# Patient Record
Sex: FEMALE | Race: BLACK OR AFRICAN AMERICAN | URBAN - METROPOLITAN AREA
[De-identification: names, ages, dates, MRNs, and addresses within clinical notes are randomized per-mention and may not be internally consistent; named-entity substitution may affect disease eponyms.]

---

## 2017-02-18 ENCOUNTER — EMERGENCY (EMERGENCY)
Facility: HOSPITAL | Age: 42
LOS: 0 days | Discharge: HOME | End: 2017-02-18
Attending: EMERGENCY MEDICINE | Admitting: EMERGENCY MEDICINE
Payer: COMMERCIAL

## 2017-02-18 VITALS
HEART RATE: 88 BPM | RESPIRATION RATE: 16 BRPM | OXYGEN SATURATION: 100 % | SYSTOLIC BLOOD PRESSURE: 113 MMHG | DIASTOLIC BLOOD PRESSURE: 61 MMHG

## 2017-02-18 VITALS
HEART RATE: 89 BPM | DIASTOLIC BLOOD PRESSURE: 65 MMHG | SYSTOLIC BLOOD PRESSURE: 109 MMHG | HEIGHT: 66 IN | RESPIRATION RATE: 17 BRPM | WEIGHT: 220.02 LBS | OXYGEN SATURATION: 100 % | TEMPERATURE: 98 F

## 2017-02-18 DIAGNOSIS — R10.9 UNSPECIFIED ABDOMINAL PAIN: ICD-10-CM

## 2017-02-18 DIAGNOSIS — R11.0 NAUSEA: ICD-10-CM

## 2017-02-18 DIAGNOSIS — R35.0 FREQUENCY OF MICTURITION: ICD-10-CM

## 2017-02-18 LAB
ALBUMIN SERPL ELPH-MCNC: 3.3 G/DL — SIGNIFICANT CHANGE UP (ref 3.3–5)
ALP SERPL-CCNC: 57 U/L — SIGNIFICANT CHANGE UP (ref 40–120)
ALT FLD-CCNC: 18 U/L — SIGNIFICANT CHANGE UP (ref 12–78)
ANION GAP SERPL CALC-SCNC: 8 MMOL/L — SIGNIFICANT CHANGE UP (ref 5–17)
APPEARANCE UR: CLEAR — SIGNIFICANT CHANGE UP
AST SERPL-CCNC: 27 U/L — SIGNIFICANT CHANGE UP (ref 15–37)
BACTERIA # UR AUTO: (no result)
BASOPHILS # BLD AUTO: 0.1 K/UL — SIGNIFICANT CHANGE UP (ref 0–0.2)
BASOPHILS NFR BLD AUTO: 0.8 % — SIGNIFICANT CHANGE UP (ref 0–2)
BILIRUB SERPL-MCNC: 0.4 MG/DL — SIGNIFICANT CHANGE UP (ref 0.2–1.2)
BILIRUB UR-MCNC: NEGATIVE — SIGNIFICANT CHANGE UP
BUN SERPL-MCNC: 11 MG/DL — SIGNIFICANT CHANGE UP (ref 7–23)
CALCIUM SERPL-MCNC: 8.7 MG/DL — SIGNIFICANT CHANGE UP (ref 8.5–10.1)
CHLORIDE SERPL-SCNC: 105 MMOL/L — SIGNIFICANT CHANGE UP (ref 96–108)
CO2 SERPL-SCNC: 25 MMOL/L — SIGNIFICANT CHANGE UP (ref 22–31)
COLOR SPEC: YELLOW — SIGNIFICANT CHANGE UP
CREAT SERPL-MCNC: 0.63 MG/DL — SIGNIFICANT CHANGE UP (ref 0.5–1.3)
DIFF PNL FLD: (no result)
EOSINOPHIL # BLD AUTO: 0 K/UL — SIGNIFICANT CHANGE UP (ref 0–0.5)
EOSINOPHIL NFR BLD AUTO: 0.6 % — SIGNIFICANT CHANGE UP (ref 0–6)
EPI CELLS # UR: SIGNIFICANT CHANGE UP
GLUCOSE SERPL-MCNC: 90 MG/DL — SIGNIFICANT CHANGE UP (ref 70–99)
GLUCOSE UR QL: NEGATIVE MG/DL — SIGNIFICANT CHANGE UP
HCT VFR BLD CALC: 38.6 % — SIGNIFICANT CHANGE UP (ref 34.5–45)
HGB BLD-MCNC: 12.5 G/DL — SIGNIFICANT CHANGE UP (ref 11.5–15.5)
KETONES UR-MCNC: NEGATIVE — SIGNIFICANT CHANGE UP
LEUKOCYTE ESTERASE UR-ACNC: NEGATIVE — SIGNIFICANT CHANGE UP
LIDOCAIN IGE QN: 155 U/L — SIGNIFICANT CHANGE UP (ref 73–393)
LYMPHOCYTES # BLD AUTO: 2.7 K/UL — SIGNIFICANT CHANGE UP (ref 1–3.3)
LYMPHOCYTES # BLD AUTO: 35.9 % — SIGNIFICANT CHANGE UP (ref 13–44)
MCHC RBC-ENTMCNC: 27.2 PG — SIGNIFICANT CHANGE UP (ref 27–34)
MCHC RBC-ENTMCNC: 32.4 GM/DL — SIGNIFICANT CHANGE UP (ref 32–36)
MCV RBC AUTO: 84 FL — SIGNIFICANT CHANGE UP (ref 80–100)
MONOCYTES # BLD AUTO: 0.4 K/UL — SIGNIFICANT CHANGE UP (ref 0–0.9)
MONOCYTES NFR BLD AUTO: 4.9 % — SIGNIFICANT CHANGE UP (ref 2–14)
NEUTROPHILS # BLD AUTO: 4.3 K/UL — SIGNIFICANT CHANGE UP (ref 1.8–7.4)
NEUTROPHILS NFR BLD AUTO: 57.8 % — SIGNIFICANT CHANGE UP (ref 43–77)
NITRITE UR-MCNC: NEGATIVE — SIGNIFICANT CHANGE UP
PH UR: 5 — SIGNIFICANT CHANGE UP (ref 4.8–8)
PLATELET # BLD AUTO: 250 K/UL — SIGNIFICANT CHANGE UP (ref 150–400)
POTASSIUM SERPL-MCNC: 5.1 MMOL/L — SIGNIFICANT CHANGE UP (ref 3.5–5.3)
POTASSIUM SERPL-SCNC: 5.1 MMOL/L — SIGNIFICANT CHANGE UP (ref 3.5–5.3)
PROT SERPL-MCNC: 7.6 GM/DL — SIGNIFICANT CHANGE UP (ref 6–8.3)
PROT UR-MCNC: 15 MG/DL
RBC # BLD: 4.59 M/UL — SIGNIFICANT CHANGE UP (ref 3.8–5.2)
RBC # FLD: 13.1 % — SIGNIFICANT CHANGE UP (ref 10.3–14.5)
RBC CASTS # UR COMP ASSIST: (no result) /HPF (ref 0–4)
SODIUM SERPL-SCNC: 138 MMOL/L — SIGNIFICANT CHANGE UP (ref 135–145)
SP GR SPEC: 1.02 — SIGNIFICANT CHANGE UP (ref 1.01–1.02)
UROBILINOGEN FLD QL: NEGATIVE MG/DL — SIGNIFICANT CHANGE UP
WBC # BLD: 7.5 K/UL — SIGNIFICANT CHANGE UP (ref 3.8–10.5)
WBC # FLD AUTO: 7.5 K/UL — SIGNIFICANT CHANGE UP (ref 3.8–10.5)
WBC UR QL: SIGNIFICANT CHANGE UP

## 2017-02-18 PROCEDURE — 74176 CT ABD & PELVIS W/O CONTRAST: CPT | Mod: 26

## 2017-02-18 PROCEDURE — 99053 MED SERV 10PM-8AM 24 HR FAC: CPT

## 2017-02-18 PROCEDURE — 99284 EMERGENCY DEPT VISIT MOD MDM: CPT | Mod: 25

## 2017-02-18 RX ORDER — SODIUM CHLORIDE 9 MG/ML
1000 INJECTION INTRAMUSCULAR; INTRAVENOUS; SUBCUTANEOUS ONCE
Qty: 0 | Refills: 0 | Status: COMPLETED | OUTPATIENT
Start: 2017-02-18 | End: 2017-02-18

## 2017-02-18 RX ORDER — SODIUM CHLORIDE 9 MG/ML
3 INJECTION INTRAMUSCULAR; INTRAVENOUS; SUBCUTANEOUS ONCE
Qty: 0 | Refills: 0 | Status: COMPLETED | OUTPATIENT
Start: 2017-02-18 | End: 2017-02-18

## 2017-02-18 RX ORDER — KETOROLAC TROMETHAMINE 30 MG/ML
30 SYRINGE (ML) INJECTION ONCE
Qty: 0 | Refills: 0 | Status: DISCONTINUED | OUTPATIENT
Start: 2017-02-18 | End: 2017-02-18

## 2017-02-18 RX ORDER — ACETAMINOPHEN 500 MG
650 TABLET ORAL ONCE
Qty: 0 | Refills: 0 | Status: COMPLETED | OUTPATIENT
Start: 2017-02-18 | End: 2017-02-18

## 2017-02-18 RX ADMIN — SODIUM CHLORIDE 3 MILLILITER(S): 9 INJECTION INTRAMUSCULAR; INTRAVENOUS; SUBCUTANEOUS at 03:38

## 2017-02-18 RX ADMIN — Medication 30 MILLIGRAM(S): at 04:09

## 2017-02-18 RX ADMIN — Medication 650 MILLIGRAM(S): at 05:00

## 2017-02-18 RX ADMIN — SODIUM CHLORIDE 1000 MILLILITER(S): 9 INJECTION INTRAMUSCULAR; INTRAVENOUS; SUBCUTANEOUS at 03:38

## 2017-02-18 RX ADMIN — Medication 30 MILLIGRAM(S): at 03:39

## 2017-02-18 NOTE — ED ADULT NURSE NOTE - OBJECTIVE STATEMENT
41 year old female presenting to the ED complaining of flank pain x 1 month. Patient reports the pain has been increasing over the past month, and got worse tonight. Patient also reports frequent urination.

## 2017-02-18 NOTE — ED ADULT NURSE NOTE - CHPI ED SYMPTOMS NEG
no difficulty bearing weight/no constipation/no anorexia/no motor function loss/no neck tenderness/no tingling/no bladder dysfunction/no bowel dysfunction

## 2017-02-19 LAB
CULTURE RESULTS: NO GROWTH — SIGNIFICANT CHANGE UP
SPECIMEN SOURCE: SIGNIFICANT CHANGE UP

## 2017-07-08 ENCOUNTER — EMERGENCY (EMERGENCY)
Facility: HOSPITAL | Age: 42
LOS: 0 days | Discharge: HOME | End: 2017-07-08
Attending: EMERGENCY MEDICINE | Admitting: EMERGENCY MEDICINE
Payer: COMMERCIAL

## 2017-07-08 VITALS
TEMPERATURE: 98 F | DIASTOLIC BLOOD PRESSURE: 69 MMHG | OXYGEN SATURATION: 100 % | SYSTOLIC BLOOD PRESSURE: 114 MMHG | HEART RATE: 72 BPM | RESPIRATION RATE: 18 BRPM

## 2017-07-08 VITALS — HEIGHT: 66 IN | WEIGHT: 220.9 LBS

## 2017-07-08 DIAGNOSIS — S83.91XA SPRAIN OF UNSPECIFIED SITE OF RIGHT KNEE, INITIAL ENCOUNTER: ICD-10-CM

## 2017-07-08 DIAGNOSIS — S93.491A SPRAIN OF OTHER LIGAMENT OF RIGHT ANKLE, INITIAL ENCOUNTER: ICD-10-CM
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
## 2017-07-08 PROCEDURE — 99284 EMERGENCY DEPT VISIT MOD MDM: CPT

## 2017-07-08 PROCEDURE — 73630 X-RAY EXAM OF FOOT: CPT | Mod: 26,LT

## 2017-07-08 PROCEDURE — 73600 X-RAY EXAM OF ANKLE: CPT | Mod: 26

## 2017-07-08 PROCEDURE — 73564 X-RAY EXAM KNEE 4 OR MORE: CPT | Mod: 26,RT

## 2017-07-08 PROCEDURE — 29505 APPLICATION LONG LEG SPLINT: CPT | Mod: RT

## 2017-07-08 RX ORDER — OXYCODONE HYDROCHLORIDE 5 MG/1
5 TABLET ORAL ONCE
Qty: 0 | Refills: 0 | Status: DISCONTINUED | OUTPATIENT
Start: 2017-07-08 | End: 2017-07-08

## 2017-07-08 RX ADMIN — OXYCODONE HYDROCHLORIDE 5 MILLIGRAM(S): 5 TABLET ORAL at 12:54

## 2017-07-08 RX ADMIN — OXYCODONE HYDROCHLORIDE 5 MILLIGRAM(S): 5 TABLET ORAL at 12:15

## 2017-07-08 NOTE — ED STATDOCS - PROGRESS NOTE DETAILS
DILEEP [place]: Patient has been seen, evaluated and orders have been written by the attending in intake. Patient is stable.  I will follow up the results of orders written and I will continue to evaluate/observe the patient. pt aware of xray findings. d/w dr gerard. will give crutches to assist in ambulation

## 2017-07-08 NOTE — ED STATDOCS - SECONDARY DIAGNOSIS.
Sprain of right ankle, unspecified ligament, initial encounter Foot sprain, right, initial encounter

## 2017-07-08 NOTE — ED STATDOCS - CARE PLAN
Principal Discharge DX:	Sprain of right knee, unspecified ligament, initial encounter  Secondary Diagnosis:	Sprain of right ankle, unspecified ligament, initial encounter  Secondary Diagnosis:	Foot sprain, right, initial encounter

## 2017-07-08 NOTE — ED STATDOCS - OBJECTIVE STATEMENT
40 y/o F w/ no PHMx presents to ED c/o R knee pain s/p mechanical fall at work on wet floor. Pt states her right foot went under her when she fell, pain in her, R anterior knee and R ankle, pt unable to ambulate. Pt currently calm, not presenting distress, and denies hitting her head, LOC, or any other acute c/o. Pt states she took 3 Motrin.

## 2019-05-23 ENCOUNTER — EMERGENCY (EMERGENCY)
Facility: HOSPITAL | Age: 44
LOS: 0 days | Discharge: HOME | End: 2019-05-23
Attending: EMERGENCY MEDICINE | Admitting: EMERGENCY MEDICINE
Payer: COMMERCIAL

## 2019-05-23 VITALS
HEART RATE: 81 BPM | DIASTOLIC BLOOD PRESSURE: 75 MMHG | TEMPERATURE: 98 F | OXYGEN SATURATION: 100 % | RESPIRATION RATE: 18 BRPM | SYSTOLIC BLOOD PRESSURE: 117 MMHG

## 2019-05-23 VITALS — HEIGHT: 66 IN | WEIGHT: 238.1 LBS

## 2019-05-23 DIAGNOSIS — K59.00 CONSTIPATION, UNSPECIFIED: ICD-10-CM

## 2019-05-23 DIAGNOSIS — G43.909 MIGRAINE, UNSPECIFIED, NOT INTRACTABLE, WITHOUT STATUS MIGRAINOSUS: ICD-10-CM

## 2019-05-23 DIAGNOSIS — D25.9 LEIOMYOMA OF UTERUS, UNSPECIFIED: ICD-10-CM

## 2019-05-23 DIAGNOSIS — K52.9 NONINFECTIVE GASTROENTERITIS AND COLITIS, UNSPECIFIED: ICD-10-CM

## 2019-05-23 DIAGNOSIS — N84.0 POLYP OF CORPUS UTERI: ICD-10-CM

## 2019-05-23 DIAGNOSIS — R10.9 UNSPECIFIED ABDOMINAL PAIN: ICD-10-CM

## 2019-05-23 LAB
ALBUMIN SERPL ELPH-MCNC: 3.3 G/DL — SIGNIFICANT CHANGE UP (ref 3.3–5)
ALP SERPL-CCNC: 71 U/L — SIGNIFICANT CHANGE UP (ref 40–120)
ALT FLD-CCNC: 22 U/L — SIGNIFICANT CHANGE UP (ref 12–78)
ANION GAP SERPL CALC-SCNC: 6 MMOL/L — SIGNIFICANT CHANGE UP (ref 5–17)
APPEARANCE UR: CLEAR — SIGNIFICANT CHANGE UP
AST SERPL-CCNC: 16 U/L — SIGNIFICANT CHANGE UP (ref 15–37)
BACTERIA # UR AUTO: ABNORMAL
BASOPHILS # BLD AUTO: 0 K/UL — SIGNIFICANT CHANGE UP (ref 0–0.2)
BASOPHILS NFR BLD AUTO: 0 % — SIGNIFICANT CHANGE UP (ref 0–2)
BILIRUB SERPL-MCNC: 0.3 MG/DL — SIGNIFICANT CHANGE UP (ref 0.2–1.2)
BILIRUB UR-MCNC: NEGATIVE — SIGNIFICANT CHANGE UP
BUN SERPL-MCNC: 9 MG/DL — SIGNIFICANT CHANGE UP (ref 7–23)
CALCIUM SERPL-MCNC: 9.1 MG/DL — SIGNIFICANT CHANGE UP (ref 8.5–10.1)
CHLORIDE SERPL-SCNC: 104 MMOL/L — SIGNIFICANT CHANGE UP (ref 96–108)
CO2 SERPL-SCNC: 28 MMOL/L — SIGNIFICANT CHANGE UP (ref 22–31)
COLOR SPEC: YELLOW — SIGNIFICANT CHANGE UP
CREAT SERPL-MCNC: 0.6 MG/DL — SIGNIFICANT CHANGE UP (ref 0.5–1.3)
DIFF PNL FLD: ABNORMAL
EOSINOPHIL # BLD AUTO: 0.3 K/UL — SIGNIFICANT CHANGE UP (ref 0–0.5)
EOSINOPHIL NFR BLD AUTO: 4 % — SIGNIFICANT CHANGE UP (ref 0–6)
EPI CELLS # UR: ABNORMAL
GLUCOSE SERPL-MCNC: 89 MG/DL — SIGNIFICANT CHANGE UP (ref 70–99)
GLUCOSE UR QL: NEGATIVE MG/DL — SIGNIFICANT CHANGE UP
HCT VFR BLD CALC: 37.4 % — SIGNIFICANT CHANGE UP (ref 34.5–45)
HGB BLD-MCNC: 11.7 G/DL — SIGNIFICANT CHANGE UP (ref 11.5–15.5)
KETONES UR-MCNC: NEGATIVE — SIGNIFICANT CHANGE UP
LEUKOCYTE ESTERASE UR-ACNC: ABNORMAL
LIDOCAIN IGE QN: 101 U/L — SIGNIFICANT CHANGE UP (ref 73–393)
LYMPHOCYTES # BLD AUTO: 2.48 K/UL — SIGNIFICANT CHANGE UP (ref 1–3.3)
LYMPHOCYTES # BLD AUTO: 33 % — SIGNIFICANT CHANGE UP (ref 13–44)
MANUAL SMEAR VERIFICATION: SIGNIFICANT CHANGE UP
MCHC RBC-ENTMCNC: 26.6 PG — LOW (ref 27–34)
MCHC RBC-ENTMCNC: 31.3 GM/DL — LOW (ref 32–36)
MCV RBC AUTO: 85 FL — SIGNIFICANT CHANGE UP (ref 80–100)
MONOCYTES # BLD AUTO: 0.15 K/UL — SIGNIFICANT CHANGE UP (ref 0–0.9)
MONOCYTES NFR BLD AUTO: 2 % — SIGNIFICANT CHANGE UP (ref 2–14)
NEUTROPHILS # BLD AUTO: 4.44 K/UL — SIGNIFICANT CHANGE UP (ref 1.8–7.4)
NEUTROPHILS NFR BLD AUTO: 59 % — SIGNIFICANT CHANGE UP (ref 43–77)
NITRITE UR-MCNC: NEGATIVE — SIGNIFICANT CHANGE UP
NRBC # BLD: 0 /100 — SIGNIFICANT CHANGE UP (ref 0–0)
NRBC # BLD: SIGNIFICANT CHANGE UP /100 WBCS (ref 0–0)
PH UR: 5 — SIGNIFICANT CHANGE UP (ref 5–8)
PLAT MORPH BLD: NORMAL — SIGNIFICANT CHANGE UP
PLATELET # BLD AUTO: 309 K/UL — SIGNIFICANT CHANGE UP (ref 150–400)
POTASSIUM SERPL-MCNC: 3.2 MMOL/L — LOW (ref 3.5–5.3)
POTASSIUM SERPL-SCNC: 3.2 MMOL/L — LOW (ref 3.5–5.3)
PROT SERPL-MCNC: 7.4 GM/DL — SIGNIFICANT CHANGE UP (ref 6–8.3)
PROT UR-MCNC: 15 MG/DL
RBC # BLD: 4.4 M/UL — SIGNIFICANT CHANGE UP (ref 3.8–5.2)
RBC # FLD: 14.7 % — HIGH (ref 10.3–14.5)
RBC BLD AUTO: SIGNIFICANT CHANGE UP
RBC CASTS # UR COMP ASSIST: SIGNIFICANT CHANGE UP /HPF (ref 0–4)
SODIUM SERPL-SCNC: 138 MMOL/L — SIGNIFICANT CHANGE UP (ref 135–145)
SP GR SPEC: 1.02 — SIGNIFICANT CHANGE UP (ref 1.01–1.02)
UROBILINOGEN FLD QL: NEGATIVE MG/DL — SIGNIFICANT CHANGE UP
VARIANT LYMPHS # BLD: 2 % — SIGNIFICANT CHANGE UP (ref 0–6)
WBC # BLD: 7.53 K/UL — SIGNIFICANT CHANGE UP (ref 3.8–10.5)
WBC # FLD AUTO: 7.53 K/UL — SIGNIFICANT CHANGE UP (ref 3.8–10.5)
WBC UR QL: ABNORMAL

## 2019-05-23 PROCEDURE — 74177 CT ABD & PELVIS W/CONTRAST: CPT | Mod: 26

## 2019-05-23 PROCEDURE — 76856 US EXAM PELVIC COMPLETE: CPT | Mod: 26

## 2019-05-23 PROCEDURE — 99284 EMERGENCY DEPT VISIT MOD MDM: CPT

## 2019-05-23 RX ORDER — POTASSIUM CHLORIDE 20 MEQ
40 PACKET (EA) ORAL ONCE
Refills: 0 | Status: COMPLETED | OUTPATIENT
Start: 2019-05-23 | End: 2019-05-23

## 2019-05-23 RX ORDER — FAMOTIDINE 10 MG/ML
20 INJECTION INTRAVENOUS ONCE
Refills: 0 | Status: COMPLETED | OUTPATIENT
Start: 2019-05-23 | End: 2019-05-23

## 2019-05-23 RX ORDER — ACETAMINOPHEN 500 MG
650 TABLET ORAL ONCE
Refills: 0 | Status: COMPLETED | OUTPATIENT
Start: 2019-05-23 | End: 2019-05-23

## 2019-05-23 RX ADMIN — Medication 650 MILLIGRAM(S): at 18:00

## 2019-05-23 RX ADMIN — Medication 30 MILLILITER(S): at 17:45

## 2019-05-23 RX ADMIN — Medication 40 MILLIEQUIVALENT(S): at 18:07

## 2019-05-23 RX ADMIN — FAMOTIDINE 20 MILLIGRAM(S): 10 INJECTION INTRAVENOUS at 17:45

## 2019-05-23 RX ADMIN — Medication 650 MILLIGRAM(S): at 17:45

## 2019-05-23 RX ADMIN — Medication 1 TABLET(S): at 19:24

## 2019-05-23 NOTE — ED STATDOCS - CARE PLAN
Principal Discharge DX:	Colitis  Secondary Diagnosis:	Fibroid (bleeding) (uterine)  Secondary Diagnosis:	Polyp of female genital tract

## 2019-05-23 NOTE — ED ADULT NURSE NOTE - NSFALLRSKINDICATORS_ED_ALL_ED
I called her number at [phone] but did not want to leave a message regarding hepatitis C status.  This is being sent for confirmation so we'll try back early next week.   no

## 2019-05-23 NOTE — ED STATDOCS - PROGRESS NOTE DETAILS
44 y/o female with a PMHx of 2 c-sections presents to the ED c/o diffuse abd pain. +migraines. +constipation. Pain started 4 days ago in LLQ and has moved to epigastric area and LUQ. Exacerbated after eating. Pt is eating normally though. Pt was seen at an  and was given Zofran for nausea x3 days ago but has finished medication. Had endoscopy a few months ago for similar sx's that happen every year secondary to gastritis. Denies fever, CP, SOB, dysuria. Currently on her menstrual cycle x12 days which is abnormal for pt. No OB/GYN. NKDA. Nonsmoker.  Christie Rosen PA-C WIll obtain US to rule out fibroid, labs, CT. Christie Rosen PA-C I discussed in detail labs/CT/US results with patient.  Pt. added on she has had rectal bleeding "for a long time".  I stressed importance of following up with GI for colonoscopy and GYN for further evaluation of the "polyp" and fibroid on US.  Pt. understands this may be pre-cancerous or cancerous.  Copies of labs/US/CT provided to patient.  Augmentin 875 mg PO to treat the colitis.  Christie Rosen PA-C

## 2019-05-23 NOTE — ED ADULT NURSE NOTE - NSIMPLEMENTINTERV_GEN_ALL_ED
Implemented All Universal Safety Interventions:  [place] to call system. Call bell, personal items and telephone within reach. Instruct patient to call for assistance. Room bathroom lighting operational. Non-slip footwear when patient is off stretcher. Physically safe environment: no spills, clutter or unnecessary equipment. Stretcher in lowest position, wheels locked, appropriate side rails in place.

## 2019-05-23 NOTE — ED STATDOCS - PHYSICAL EXAMINATION
Constitutional: no apparent distress AAOx3, Eyes: PERRLA EOMI, Head: Normocephalic atraumatic, Mouth: MMM, Cardiac: regular rate and rhythm, Resp: Lungs CTAB, GI: +TTP LUQ, epigastric region, Neuro: CN2-12 intact, Skin: No rashes Constitutional: no apparent distress AAOx3, Eyes: PERRLA EOMI, Head: Normocephalic atraumatic, Mouth: MMM, Cardiac: regular rate and rhythm, Resp: Lungs CTAB, GI: +TTP LUQ/llq, epigastric region, Neuro: CN2-12 intact, Skin: No rashes

## 2019-05-23 NOTE — ED STATDOCS - OBJECTIVE STATEMENT
42 y/o female with a PMHx of 2 c-sections presents to the ED c/o diffuse abd pain. +migraines. +constipation. Pain started 4 days ago in LLQ and has moved to epigastric area and LUQ. Exacerbated after eating. Pt is eating normally though. Pt was seen at an  and was given Zofran for nausea x3 days ago but has finished medication. Had endoscopy a few months ago for similar sx's that happen every year secondary to gastritis. Denies fever, CP, SOB, dysuria. Currently on her menstrual cycle x12 days which is abnormal for pt. No OB/GYN. NKDA. Nonsmoker.

## 2019-05-23 NOTE — ED STATDOCS - NS_ ATTENDINGSCRIBEDETAILS _ED_A_ED_FT
I, Andrey Charles MD,  performed the initial face to face bedside interview with this patient regarding history of present illness, review of symptoms and relevant past medical, social and family history.  I completed an independent physical examination.  I was the initial provider who evaluated this patient.  The history, relevant review of systems, past medical and surgical history, medical decision making, and physical examination was documented by the scribe in my presence and I attest to the accuracy of the documentation.

## 2019-05-23 NOTE — ED STATDOCS - NS ED ROS FT
Constitutional: No fever or chills  Eyes: No visual changes  HEENT: No throat pain  CV: No chest pain  Resp: No SOB no cough  GI: +diffuse abd pain, constipation   : No dysuria  MSK: No musculoskeletal pain  Skin: No rash  Neuro: +migraines

## 2019-05-23 NOTE — ED STATDOCS - CLINICAL SUMMARY MEDICAL DECISION MAKING FREE TEXT BOX
44 y/o female presents to ED with abd pain, N/V, on left side for few days. Also with constipation and abnormal bleeding. Exam TTP LUQ and LLQ. No rebound, no guarding. Concern for uterine pathology vs. diverticulitis. Will obtain labs, US, CT, and reassess. 44 y/o female presents to ED with abd pain, N/V, on left side for few days. Also with constipation and abnormal bleeding. Exam TTP LUQ and LLQ. No rebound, no guarding. Concern for uterine pathology vs. diverticulitis. Will obtain labs, US, CT, and reassess.    I discussed in detail labs/CT/US results with patient.  Pt. added on she has had rectal bleeding "for a long time".  I stressed importance of following up with GI for colonoscopy and GYN for further evaluation of the "polyp" and fibroid on US.  Pt. understands this may be pre-cancerous or cancerous.  Copies of labs/US/CT provided to patient.  Augmentin 875 mg PO to treat the colitis.  Christie Rosen PA-C

## 2019-06-04 ENCOUNTER — RESULT REVIEW (OUTPATIENT)
Age: 44
End: 2019-06-04

## 2021-06-27 ENCOUNTER — EMERGENCY (EMERGENCY)
Facility: HOSPITAL | Age: 46
LOS: 0 days | Discharge: HOME | End: 2021-06-27
Attending: EMERGENCY MEDICINE
Payer: COMMERCIAL

## 2021-06-27 VITALS
TEMPERATURE: 99 F | SYSTOLIC BLOOD PRESSURE: 121 MMHG | OXYGEN SATURATION: 100 % | DIASTOLIC BLOOD PRESSURE: 77 MMHG | RESPIRATION RATE: 17 BRPM | HEART RATE: 81 BPM

## 2021-06-27 VITALS — WEIGHT: 237 LBS | HEIGHT: 66 IN

## 2021-06-27 DIAGNOSIS — Z98.890 OTHER SPECIFIED POSTPROCEDURAL STATES: Chronic | ICD-10-CM

## 2021-06-27 DIAGNOSIS — N39.0 URINARY TRACT INFECTION, SITE NOT SPECIFIED: ICD-10-CM

## 2021-06-27 DIAGNOSIS — Z98.891 HISTORY OF UTERINE SCAR FROM PREVIOUS SURGERY: Chronic | ICD-10-CM

## 2021-06-27 DIAGNOSIS — K21.9 GASTRO-ESOPHAGEAL REFLUX DISEASE WITHOUT ESOPHAGITIS: ICD-10-CM

## 2021-06-27 DIAGNOSIS — Z87.39 PERSONAL HISTORY OF OTHER DISEASES OF THE MUSCULOSKELETAL SYSTEM AND CONNECTIVE TISSUE: ICD-10-CM

## 2021-06-27 DIAGNOSIS — R10.13 EPIGASTRIC PAIN: ICD-10-CM

## 2021-06-27 LAB
ALBUMIN SERPL ELPH-MCNC: 3.2 G/DL — LOW (ref 3.3–5)
ALP SERPL-CCNC: 67 U/L — SIGNIFICANT CHANGE UP (ref 40–120)
ALT FLD-CCNC: 21 U/L — SIGNIFICANT CHANGE UP (ref 12–78)
ANION GAP SERPL CALC-SCNC: 6 MMOL/L — SIGNIFICANT CHANGE UP (ref 5–17)
APPEARANCE UR: CLEAR — SIGNIFICANT CHANGE UP
AST SERPL-CCNC: 21 U/L — SIGNIFICANT CHANGE UP (ref 15–37)
BASOPHILS # BLD AUTO: 0.03 K/UL — SIGNIFICANT CHANGE UP (ref 0–0.2)
BASOPHILS NFR BLD AUTO: 0.4 % — SIGNIFICANT CHANGE UP (ref 0–2)
BILIRUB SERPL-MCNC: 0.2 MG/DL — SIGNIFICANT CHANGE UP (ref 0.2–1.2)
BILIRUB UR-MCNC: NEGATIVE — SIGNIFICANT CHANGE UP
BUN SERPL-MCNC: 9 MG/DL — SIGNIFICANT CHANGE UP (ref 7–23)
CALCIUM SERPL-MCNC: 9.1 MG/DL — SIGNIFICANT CHANGE UP (ref 8.5–10.1)
CHLORIDE SERPL-SCNC: 105 MMOL/L — SIGNIFICANT CHANGE UP (ref 96–108)
CO2 SERPL-SCNC: 24 MMOL/L — SIGNIFICANT CHANGE UP (ref 22–31)
COLOR SPEC: YELLOW — SIGNIFICANT CHANGE UP
CREAT SERPL-MCNC: 0.62 MG/DL — SIGNIFICANT CHANGE UP (ref 0.5–1.3)
DIFF PNL FLD: NEGATIVE — SIGNIFICANT CHANGE UP
EOSINOPHIL # BLD AUTO: 0.05 K/UL — SIGNIFICANT CHANGE UP (ref 0–0.5)
EOSINOPHIL NFR BLD AUTO: 0.6 % — SIGNIFICANT CHANGE UP (ref 0–6)
GLUCOSE SERPL-MCNC: 94 MG/DL — SIGNIFICANT CHANGE UP (ref 70–99)
GLUCOSE UR QL: NEGATIVE MG/DL — SIGNIFICANT CHANGE UP
HCT VFR BLD CALC: 35 % — SIGNIFICANT CHANGE UP (ref 34.5–45)
HGB BLD-MCNC: 11.1 G/DL — LOW (ref 11.5–15.5)
IMM GRANULOCYTES NFR BLD AUTO: 0.2 % — SIGNIFICANT CHANGE UP (ref 0–1.5)
KETONES UR-MCNC: ABNORMAL
LEUKOCYTE ESTERASE UR-ACNC: ABNORMAL
LIDOCAIN IGE QN: 103 U/L — SIGNIFICANT CHANGE UP (ref 73–393)
LYMPHOCYTES # BLD AUTO: 2.36 K/UL — SIGNIFICANT CHANGE UP (ref 1–3.3)
LYMPHOCYTES # BLD AUTO: 29 % — SIGNIFICANT CHANGE UP (ref 13–44)
MCHC RBC-ENTMCNC: 26.2 PG — LOW (ref 27–34)
MCHC RBC-ENTMCNC: 31.7 GM/DL — LOW (ref 32–36)
MCV RBC AUTO: 82.7 FL — SIGNIFICANT CHANGE UP (ref 80–100)
MONOCYTES # BLD AUTO: 0.56 K/UL — SIGNIFICANT CHANGE UP (ref 0–0.9)
MONOCYTES NFR BLD AUTO: 6.9 % — SIGNIFICANT CHANGE UP (ref 2–14)
NEUTROPHILS # BLD AUTO: 5.12 K/UL — SIGNIFICANT CHANGE UP (ref 1.8–7.4)
NEUTROPHILS NFR BLD AUTO: 62.9 % — SIGNIFICANT CHANGE UP (ref 43–77)
NITRITE UR-MCNC: NEGATIVE — SIGNIFICANT CHANGE UP
PH UR: 5 — SIGNIFICANT CHANGE UP (ref 5–8)
PLATELET # BLD AUTO: 324 K/UL — SIGNIFICANT CHANGE UP (ref 150–400)
POTASSIUM SERPL-MCNC: 3.5 MMOL/L — SIGNIFICANT CHANGE UP (ref 3.5–5.3)
POTASSIUM SERPL-SCNC: 3.5 MMOL/L — SIGNIFICANT CHANGE UP (ref 3.5–5.3)
PROT SERPL-MCNC: 7.5 GM/DL — SIGNIFICANT CHANGE UP (ref 6–8.3)
PROT UR-MCNC: 30 MG/DL
RBC # BLD: 4.23 M/UL — SIGNIFICANT CHANGE UP (ref 3.8–5.2)
RBC # FLD: 15 % — HIGH (ref 10.3–14.5)
SODIUM SERPL-SCNC: 135 MMOL/L — SIGNIFICANT CHANGE UP (ref 135–145)
SP GR SPEC: 1.02 — SIGNIFICANT CHANGE UP (ref 1.01–1.02)
UROBILINOGEN FLD QL: NEGATIVE MG/DL — SIGNIFICANT CHANGE UP
WBC # BLD: 8.14 K/UL — SIGNIFICANT CHANGE UP (ref 3.8–10.5)
WBC # FLD AUTO: 8.14 K/UL — SIGNIFICANT CHANGE UP (ref 3.8–10.5)

## 2021-06-27 PROCEDURE — 36415 COLL VENOUS BLD VENIPUNCTURE: CPT

## 2021-06-27 PROCEDURE — 87086 URINE CULTURE/COLONY COUNT: CPT

## 2021-06-27 PROCEDURE — 99285 EMERGENCY DEPT VISIT HI MDM: CPT

## 2021-06-27 PROCEDURE — 85025 COMPLETE CBC W/AUTO DIFF WBC: CPT

## 2021-06-27 PROCEDURE — 76705 ECHO EXAM OF ABDOMEN: CPT | Mod: 26

## 2021-06-27 PROCEDURE — 93010 ELECTROCARDIOGRAM REPORT: CPT

## 2021-06-27 PROCEDURE — 83690 ASSAY OF LIPASE: CPT

## 2021-06-27 PROCEDURE — 80053 COMPREHEN METABOLIC PANEL: CPT

## 2021-06-27 PROCEDURE — 99284 EMERGENCY DEPT VISIT MOD MDM: CPT | Mod: 25

## 2021-06-27 PROCEDURE — 81001 URINALYSIS AUTO W/SCOPE: CPT

## 2021-06-27 PROCEDURE — 93005 ELECTROCARDIOGRAM TRACING: CPT

## 2021-06-27 PROCEDURE — 76705 ECHO EXAM OF ABDOMEN: CPT

## 2021-06-27 PROCEDURE — C9113: CPT

## 2021-06-27 RX ORDER — PANTOPRAZOLE SODIUM 20 MG/1
40 TABLET, DELAYED RELEASE ORAL ONCE
Refills: 0 | Status: COMPLETED | OUTPATIENT
Start: 2021-06-27 | End: 2021-06-27

## 2021-06-27 RX ORDER — OMEPRAZOLE 10 MG/1
1 CAPSULE, DELAYED RELEASE ORAL
Qty: 10 | Refills: 0
Start: 2021-06-27 | End: 2021-07-06

## 2021-06-27 RX ORDER — CEPHALEXIN 500 MG
500 CAPSULE ORAL ONCE
Refills: 0 | Status: COMPLETED | OUTPATIENT
Start: 2021-06-27 | End: 2021-06-27

## 2021-06-27 RX ORDER — CEPHALEXIN 500 MG
1 CAPSULE ORAL
Qty: 14 | Refills: 0
Start: 2021-06-27 | End: 2021-07-03

## 2021-06-27 RX ADMIN — Medication 30 MILLILITER(S): at 21:36

## 2021-06-27 RX ADMIN — Medication 500 MILLIGRAM(S): at 22:58

## 2021-06-27 RX ADMIN — PANTOPRAZOLE SODIUM 40 MILLIGRAM(S): 20 TABLET, DELAYED RELEASE ORAL at 21:36

## 2021-06-27 NOTE — ED STATDOCS - CARE PROVIDER_API CALL
Andrey Marshall  GASTROENTEROLOGY  [address] [place]  [place] [address]  Phone: [phone]  Fax: [phone]  Follow Up Time:     Andrey Alejandro)  Gastroenterology; Internal Medicine  [address] [place] [address] [place] [address]  Phone: [phone]  Fax: [phone]  Follow Up Time:

## 2021-06-27 NOTE — ED STATDOCS - NSFOLLOWUPINSTRUCTIONS_ED_ALL_ED_FT
Urinary Tract Infection, Adult  ImageA urinary tract infection (UTI) is an infection of any part of the urinary tract, which includes the kidneys, ureters, bladder, and urethra. These organs make, store, and get rid of urine in the body. UTI can be a bladder infection (cystitis) or kidney infection (pyelonephritis).    What are the causes?  This infection may be caused by fungi, viruses, or bacteria. Bacteria are the most common cause of UTIs. This condition can also be caused by repeated incomplete emptying of the bladder during urination.    What increases the risk?  This condition is more likely to develop if:    You ignore your need to urinate or hold urine for long periods of time.  You do not empty your bladder completely during urination.  You wipe back to front after urinating or having a bowel movement, if you are female.  You are uncircumcised, if you are male.  You are constipated.  You have a urinary catheter that stays in place (indwelling).  You have a weak defense (immune) system.  You have a medical condition that affects your bowels, kidneys, or bladder.  You have diabetes.  You take antibiotic medicines frequently or for long periods of time, and the antibiotics no longer work well against certain types of infections (antibiotic resistance).  You take medicines that irritate your urinary tract.  You are exposed to chemicals that irritate your urinary tract.  You are female.    What are the signs or symptoms?  Symptoms of this condition include:    Fever.  Frequent urination or passing small amounts of urine frequently.  Needing to urinate urgently.  Pain or burning with urination.  Urine that smells bad or unusual.  Cloudy urine.  Pain in the lower abdomen or back.  Trouble urinating.  Blood in the urine.  Vomiting or being less hungry than normal.  Diarrhea or abdominal pain.  Vaginal discharge, if you are female.    How is this diagnosed?  This condition is diagnosed with a medical history and physical exam. You will also need to provide a urine sample to test your urine. Other tests may be done, including:    Blood tests.  Sexually transmitted disease (STD) testing.    If you have had more than one UTI, a cystoscopy or imaging studies may be done to determine the cause of the infections.    How is this treated?  Treatment for this condition often includes a combination of two or more of the following:    Antibiotic medicine.  Other medicines to treat less common causes of UTI.  Over-the-counter medicines to treat pain.  Drinking enough water to stay hydrated.    Follow these instructions at home:  Take over-the-counter and prescription medicines only as told by your health care provider.  If you were prescribed an antibiotic, take it as told by your health care provider. Do not stop taking the antibiotic even if you start to feel better.  Avoid alcohol, caffeine, tea, and carbonated beverages. They can irritate your bladder.  Drink enough fluid to keep your urine clear or pale yellow.  Keep all follow-up visits as told by your health care provider. This is important.  ImageMake sure to:    Empty your bladder often and completely. Do not hold urine for long periods of time.  Empty your bladder before and after sex.  Wipe from front to back after a bowel movement if you are female. Use each tissue one time when you wipe.    Contact a health care provider if:  You have back pain.  You have a fever.  You feel nauseous or vomit.  Your symptoms do not get better after 3 days.  Your symptoms go away and then return.  Get help right away if:  You have severe back pain or lower abdominal pain.  You are vomiting and cannot keep down any medicines or water.  This information is not intended to replace advice given to you by your health care provider. Make sure you discuss any questions you have with your health care provider.      Epigastric Pain    WHAT YOU NEED TO KNOW:    Epigastric pain is felt in the middle of the upper abdomen, between the ribs and the bellybutton. The pain may be mild or severe. Pain may spread from or to another part of your body. Epigastric pain may be a sign of a serious health problem that needs to be treated.     DISCHARGE INSTRUCTIONS:    Call 911 for any of the following:   •You have any of the following signs of a heart attack: ?Squeezing, pressure, or pain in your chest      ?You may also have any of the following: ?Discomfort or pain in your back, neck, jaw, stomach, or arm      ?Shortness of breath      ?Nausea or vomiting      ?Lightheadedness or a sudden cold sweat        •You have severe pain that radiates to your jaw or back.      Return to the emergency department if:   •You have severe pain that starts suddenly and quickly gets worse.      •You cannot have a bowel movement and are vomiting.      •You vomit or cough up blood.      •You see blood in your urine or bowel movement.      •You feel drowsy and your breathing is slower than usual.      Contact your healthcare provider if:   •You have a fever or chills.      •You have yellowing of your skin or the whites of your eyes.      •You vomit often or several times in a row.       •You lose weight without trying.      •You have symptoms for longer than 2 weeks.      •You have questions or concerns about your condition or care.      Medicines:   •Medicines may be given to treat pain or stop vomiting. You may also need medicines to reduce or control stomach acid, or treat an infection.      •Take your medicine as directed. Contact your healthcare provider if you think your medicine is not helping or if you have side effects. Tell him of her if you are allergic to any medicine. Keep a list of the medicines, vitamins, and herbs you take. Include the amounts, and when and why you take them. Bring the list or the pill bottles to follow-up visits. Carry your medicine list with you in case of an emergency.      Follow up with your healthcare provider as directed: Write down your questions so you remember to ask them during your visits.     Manage your symptoms:   •Keep a record of your symptoms. Include when the pain starts, how long it lasts, and if it is sharp or dull. Also include any foods you ate or activities you did before the pain started. Keep track of anything that helped the pain.       •Eat a variety of healthy foods. Healthy foods include fruits, vegetables, whole-grain breads, low-fat dairy products, beans, lean meats, and fish. Ask if you need to be on a special diet. Certain foods may cause your pain, such as alcohol or foods that are high in fat. You may need to eat smaller meals and to eat more often than usual.      •Drink liquids as directed. Ask how much liquid to drink each day and which liquids are best for you. Do not have drinks that contain alcohol or caffeine.    Continue Omeprazole 20 mg. PO daily.  Take Keflex as directed.  Drink plenty of fluids.  Follow up with Gastroenterologist.

## 2021-06-27 NOTE — ED STATDOCS - ATTENDING CONTRIBUTION TO CARE
Attending Contribution to Care: I, Bethany Saldana, performed the initial face to face bedside interview with this patient regarding history of present illness, review of symptoms and relevant past medical, social and family history.  I completed an independent physical examination.  I was the initial provider who evaluated this patient. I have signed out the follow up of any pending tests (i.e. labs, radiological studies) to the ACP.  I have communicated the patient’s plan of care and disposition with the ACP.

## 2021-06-27 NOTE — ED STATDOCS - OBJECTIVE STATEMENT
46 y/o F with PMHx of GERD (non compliant with PPI), gastritis, s/p endoscopy x2 in the past, s/p , s/p breast surgery, and s/p foot surgery presents ambulatory to the ED c/o intermittent +epigastric abd pain x3 weeks with associated +lightheadedness. No aggravating factors. Reports she is unable to wear a wire bra 2/2 pain. No N/V/D, hematochezia, or melena. Able to eat and drink. Notes having UTI 1 week ago treated with abx and pyridium. Has not taken anything for abd pain. Never smoker. No drug or ETOH use. NKDA.

## 2021-06-27 NOTE — ED ADULT NURSE NOTE - OBJECTIVE STATEMENT
Pt presents to ED for abdominal pain that started 3 weeks ago with worsening pain and lightheadedness today. Pt denies chest pain, shortness of breath, LOC, nausea/vomiting/diarrhea, fever/chills. Pt endorses COVID vaccinated. Pt states that she has been able to eat without any issues. Pt states she was treated for a UTI 1 week ago with antibiotics.

## 2021-06-27 NOTE — ED STATDOCS - PATIENT PORTAL LINK FT
You can access the FollowMyHealth Patient Portal offered by [facility] by registering at the following website: [email or web address]. By joining [employer]’s FollowMyHealth portal, you will also be able to view your health information using other applications (apps) compatible with our system.

## 2021-06-27 NOTE — ED STATDOCS - CLINICAL SUMMARY MEDICAL DECISION MAKING FREE TEXT BOX
44 y/o F with PMHx of gastritis non compliant with PPI here with epigastric abd pain, follows with GI locally. Epigastric TTP on exam. Labs, EKG, upper abd US to be done in ED. IV Pantoprazole and Maalox to be given.

## 2021-06-27 NOTE — ED ADULT TRIAGE NOTE - CHIEF COMPLAINT QUOTE
Pt ambulatory to ED c/o epigastric pain x3 weeks. Pt states sometimes it's so painful she's unable to keep a bra on. Denies chest pain but endorses intermitted SOB. No NVD, no belching. Also c/o intermittent migraines. Did not take anything for pain.

## 2021-06-27 NOTE — ED STATDOCS - CARE PROVIDERS DIRECT ADDRESSES
,DirectAddress_Unknown,[email or web address]

## 2021-06-29 LAB
CULTURE RESULTS: SIGNIFICANT CHANGE UP
SPECIMEN SOURCE: SIGNIFICANT CHANGE UP

## 2022-05-17 NOTE — ED PROVIDER NOTE - NSTIMEPROVIDERCAREINITIATE_GEN_ER
From: Shiloh Vidal  To: Arianna Hdez  Sent: 5/16/2022 7:43 PM CDT  Subject: Pharmacy     Hi I was wondering if you could forward all of my meds to the [facility] that's on [facility] in [place]? [facility] has a very limited employees so the wait is longer and a lot of the time they close super early.   18-Feb-2017 03:06

## 2023-10-09 NOTE — ED STATDOCS - CPE ED [facility] NORM
Physical Therapy Visit    Visit Type: Daily Treatment Note  Visit: 5  Referring Provider: Damien Lim MD  Medical Diagnosis (from order): Diagnosis Information    Diagnosis  S83.511A (ICD-10-CM) - Rupture of anterior cruciate ligament of right knee, initial encounter         SUBJECTIVE                                                                                                               Continues to get better. Denies pain at the moment.      OBJECTIVE                                                                                                                    Observation   Continues to demonstrate quadriceps lag during straight leg raise                      Treatment     Therapeutic Exercise  * unlocked brace *  Right quadricep set on swiss ball  2 X 10 with 10 second set  Right quadricep set + straight leg raise (without brace) 2 x 10   Right prone hamstring curl 2 x 10  Right prone quadricep stretch with strap 3 x 30 seconds  Right quadricep isometric (manual assist) at 90 degrees flexion 2 x 10 with 10 second holds  Right long arc quads 90-45 degrees 2 x 10  Double legged squats with brace (0-45 degrees) 2 x 10    Right forward step up with brace (4 inch) 2 x 5  Updated and reviewed home exercise with patient (access code provided below - to perform 2-3 x/day). Reviewed post operative restrictions/limitations. Patient recommended to ice 2-3 x/week 15-20 minutes.  Not today:  Right quadricep set 2 x 10 with 10 seconds hold - achieved 0 degrees   Right heel slides with strap 10 x 10 (0-118 degrees knee flexion)  Right long sitting gastrocnemius stretch with strap 3 x 30 seconds  Supine clamshell (Red Tband) 2 x 10  Right side lying hip abduction (with brace) 2 x 10   Right prone hip extension (with brace) 2 x 10  Standing double leg heel raise (with brace) 2 x 10  Supine Right Knee Extension Stretch on Towel Roll x 5 minutes - achieved 0 degrees extension  Standing medial-lateral weight shifting  (with brace) 2 x 60 seconds  Right hamstring set with swissball knee to chest  x 10    Manual Therapy   Right knee passive range of motion into flexion and extension  Super-inferior and medial-lateral patellar joint mobilizations   Posterior femur mobilization (gentle) - achieved 0 degrees   Soft tissue mobilization to facilitate swelling - not today    Gait Training  Gait training with knee braced UNLOCKED and unilateral axillary crutch on contralateral - cues for terminal knee extension and heel strike, toe off and knee flexion - 2 point, step through gait pattern.    Skilled input: verbal instruction/cues and tactile instruction/cues    Writer verbally educated and received verbal consent for hand placement, positioning of patient, and techniques to be performed today from patient for therapist position for techniques and hand placement and palpation for techniques as described above and how they are pertinent to the patient's plan of care.  Home Exercise Program  Access Code: [email or web address]  URL: [email or web address]  Date: 10/02/2023  Prepared by: Ana Baca    Exercises  - Supine Knee Extension Stretch on Towel Roll  - 2-3 x daily - 7 x weekly - 1 sets - 1 reps - 5 minutes  hold  - Supine Quad Set  - 2-3 x daily - 7 x weekly - 3 sets - 10 reps - 5-10 second hold  - Active Straight Leg Raise with Quad Set  - 2-3 x daily - 7 x weekly - 3 sets - 10 reps - 3 seconds hold  - Supine Heel Slide with Strap  - 2-3 x daily - 7 x weekly - 3 sets - 10 reps - 5 sec hold  - Prone Hip Extension  - 2-3 x daily - 7 x weekly - 3 sets - 10 reps  - Sidelying Hip Abduction  - 2-3 x daily - 7 x weekly - 3 sets - 10 reps  - Seated Heel Slide  - 2-3 x daily - 7 x weekly - 3 sets - 10 reps  - Long Sitting Ankle Pumps  - 2-3 x daily - 7 x weekly - 3 sets - 10 reps        ASSESSMENT                                                                                                            Patient progressed to closed  chain kinetic lower extremity exercises which she tolerated well. Initiated gait training with hinge knee brace unlocked - verbal cues provided throughout. Will continue to progress per plan of care.  Education:   - Results of above outlined education: Verbalizes understanding and Demonstrates understanding       Therapy procedure time and total treatment time can be found documented on the Time Entry flowsheet     normal...

## 2025-06-02 NOTE — ED STATDOCS - ATTENDING CONTRIBUTION TO CARE
Return in about 6 months (around 12/2/2025) for APRN.    Elevate legs when sitting, use zip up compression stockings  Lab today- BNP, BMP, Digoxin level     I, Moises Cloud DO,  performed the initial face to face bedside interview with this patient regarding history of present illness, review of symptoms and relevant past medical, social and family history.  I completed an independent physical examination.  I was the initial provider who evaluated this patient. I have signed out the follow up of any pending tests (i.e. labs, radiological studies) to the ACP.  I have communicated the patient’s plan of care and disposition with the ACP.  The history, relevant review of systems, past medical and surgical history, medical decision making, and physical examination was documented by the scribe in my presence and I attest to the accuracy of the documentation.

## 2025-06-06 NOTE — ED ADULT TRIAGE NOTE - HEIGHT IN FEET
RETROPERITONEUM: Limited images of the retroperitoneum demonstrate no acute abnormality.     1. No evidence of acute traumatic injury. 2. Chronic compression fracture along the inferior endplate of the T12 vertebral body. 3. Sclerotic lesions in the lumbar spine and medial iliac bones bilaterally, consistent with metastatic disease.     CT CERVICAL SPINE WO CONTRAST  Result Date: 6/5/2025  EXAM: CT CERVICAL SPINE WITHOUT CONTRAST 06/05/2025 07:38:39 PM TECHNIQUE: CT of the cervical was performed without the administration of intravenous contrast. Multiplanar reformatted images are provided for review. Automated exposure control, iterative reconstruction, and/or weight based adjustment of the mA/kV was utilized to  reduce the radiation dose to as low as reasonably achievable. COMPARISON: None available. CLINICAL HISTORY: Injury. FINDINGS: CERVICAL SPINE: BONES/ALIGNMENT: There is no acute fracture or traumatic malalignment. DEGENERATIVE CHANGES: Moderate loss of disc heights at C4-5, C5-6, and C6-7. Facet and uncovertebral joint hypertrophic changes resulting in variable degrees of neural foraminal stenoses, worse (severe) at the left C6-7 level. No significant central canal stenosis is evident by CT. SOFT TISSUES: There is no prevertebral soft tissue swelling.     1. No acute abnormality of the cervical spine. 2. Degenerative changes, most notable at C6-7.     CT HEAD WO CONTRAST  Result Date: 6/5/2025  EXAM: CT HEAD WITHOUT CONTRAST 06/05/2025 07:38:39 PM TECHNIQUE: CT of the head was performed without the administration of intravenous contrast. Automated exposure control, iterative reconstruction, and/or weight based adjustment of the mA/kV was utilized to reduce the radiation dose to as low as reasonably achievable. COMPARISON: None available. CLINICAL HISTORY: Injury. FINDINGS: BRAIN AND VENTRICLES: Mild-to-moderate cerebral volume loss. Mild chronic microvascular ischemic changes. There is no acute intracranial 
5

## 2025-06-11 ENCOUNTER — EMERGENCY (EMERGENCY)
Facility: HOSPITAL | Age: 50
LOS: 1 days | End: 2025-06-11
Payer: SELF-PAY

## 2025-06-11 VITALS
WEIGHT: 209.44 LBS | TEMPERATURE: 99 F | DIASTOLIC BLOOD PRESSURE: 74 MMHG | HEART RATE: 87 BPM | HEIGHT: 66 IN | OXYGEN SATURATION: 100 % | SYSTOLIC BLOOD PRESSURE: 137 MMHG | RESPIRATION RATE: 16 BRPM

## 2025-06-11 DIAGNOSIS — Z98.891 HISTORY OF UTERINE SCAR FROM PREVIOUS SURGERY: Chronic | ICD-10-CM

## 2025-06-11 DIAGNOSIS — Z98.890 OTHER SPECIFIED POSTPROCEDURAL STATES: Chronic | ICD-10-CM

## 2025-06-11 PROBLEM — K21.9 GASTRO-ESOPHAGEAL REFLUX DISEASE WITHOUT ESOPHAGITIS: Chronic | Status: ACTIVE | Noted: 2021-07-03

## 2025-06-11 PROBLEM — K29.70 GASTRITIS, UNSPECIFIED, WITHOUT BLEEDING: Chronic | Status: ACTIVE | Noted: 2021-07-03

## 2025-06-11 PROCEDURE — L9991: CPT

## 2025-06-11 NOTE — ED ADULT TRIAGE NOTE - CHIEF COMPLAINT QUOTE
sent in by MD for hemoglobin of 8. No use of anticoags. Reports vaginal bleeding, has irregular menstrual cycle due to fibroids. Had syncopal episode on Tuesday. + lightheadedness.

## 2025-06-11 NOTE — ED ADULT TRIAGE NOTE - EXPLANATION OF PATIENT'S REASON FOR LEAVING
Did not want to receive blood transfusion. Pt educated on process of evaluation and screening of blood products if needed.  pt refused.

## 2025-06-13 DIAGNOSIS — Z53.21 PROCEDURE AND TREATMENT NOT CARRIED OUT DUE TO PATIENT LEAVING PRIOR TO BEING SEEN BY HEALTH CARE PROVIDER: ICD-10-CM

## 2025-06-13 DIAGNOSIS — R79.9 ABNORMAL FINDING OF BLOOD CHEMISTRY, UNSPECIFIED: ICD-10-CM

## 2025-06-15 ENCOUNTER — EMERGENCY (EMERGENCY)
Facility: HOSPITAL | Age: 50
LOS: 1 days | End: 2025-06-15
Attending: STUDENT IN AN ORGANIZED HEALTH CARE EDUCATION/TRAINING PROGRAM
Payer: COMMERCIAL

## 2025-06-15 VITALS
OXYGEN SATURATION: 100 % | DIASTOLIC BLOOD PRESSURE: 75 MMHG | HEART RATE: 91 BPM | HEIGHT: 66 IN | RESPIRATION RATE: 18 BRPM | SYSTOLIC BLOOD PRESSURE: 132 MMHG | TEMPERATURE: 98 F

## 2025-06-15 VITALS
SYSTOLIC BLOOD PRESSURE: 106 MMHG | HEART RATE: 86 BPM | TEMPERATURE: 98 F | RESPIRATION RATE: 19 BRPM | DIASTOLIC BLOOD PRESSURE: 67 MMHG | OXYGEN SATURATION: 100 %

## 2025-06-15 DIAGNOSIS — Z98.890 OTHER SPECIFIED POSTPROCEDURAL STATES: Chronic | ICD-10-CM

## 2025-06-15 DIAGNOSIS — Z98.891 HISTORY OF UTERINE SCAR FROM PREVIOUS SURGERY: Chronic | ICD-10-CM

## 2025-06-15 LAB
ALBUMIN SERPL ELPH-MCNC: 4 G/DL — SIGNIFICANT CHANGE UP (ref 3.3–5.2)
ALP SERPL-CCNC: 63 U/L — SIGNIFICANT CHANGE UP (ref 40–120)
ALT FLD-CCNC: 10 U/L — SIGNIFICANT CHANGE UP
ANION GAP SERPL CALC-SCNC: 12 MMOL/L — SIGNIFICANT CHANGE UP (ref 5–17)
APTT BLD: 24.7 SEC — LOW (ref 26.1–36.8)
AST SERPL-CCNC: 15 U/L — SIGNIFICANT CHANGE UP
BASOPHILS # BLD AUTO: 0.03 K/UL — SIGNIFICANT CHANGE UP (ref 0–0.2)
BASOPHILS # BLD MANUAL: 0.04 K/UL — SIGNIFICANT CHANGE UP (ref 0–0.2)
BASOPHILS NFR BLD AUTO: 0.6 % — SIGNIFICANT CHANGE UP (ref 0–2)
BASOPHILS NFR BLD MANUAL: 0.8 % — SIGNIFICANT CHANGE UP (ref 0–2)
BILIRUB SERPL-MCNC: 0.2 MG/DL — LOW (ref 0.4–2)
BLD GP AB SCN SERPL QL: SIGNIFICANT CHANGE UP
BUN SERPL-MCNC: 8.3 MG/DL — SIGNIFICANT CHANGE UP (ref 8–20)
CALCIUM SERPL-MCNC: 9.2 MG/DL — SIGNIFICANT CHANGE UP (ref 8.4–10.5)
CHLORIDE SERPL-SCNC: 101 MMOL/L — SIGNIFICANT CHANGE UP (ref 96–108)
CO2 SERPL-SCNC: 22 MMOL/L — SIGNIFICANT CHANGE UP (ref 22–29)
CREAT SERPL-MCNC: 0.46 MG/DL — LOW (ref 0.5–1.3)
DACRYOCYTES BLD QL SMEAR: SLIGHT — SIGNIFICANT CHANGE UP
EGFR: 117 ML/MIN/1.73M2 — SIGNIFICANT CHANGE UP
EGFR: 117 ML/MIN/1.73M2 — SIGNIFICANT CHANGE UP
ELLIPTOCYTES BLD QL SMEAR: SLIGHT — SIGNIFICANT CHANGE UP
EOSINOPHIL # BLD AUTO: 0.11 K/UL — SIGNIFICANT CHANGE UP (ref 0–0.5)
EOSINOPHIL # BLD MANUAL: 0.04 K/UL — SIGNIFICANT CHANGE UP (ref 0–0.5)
EOSINOPHIL NFR BLD AUTO: 2.2 % — SIGNIFICANT CHANGE UP (ref 0–6)
EOSINOPHIL NFR BLD MANUAL: 0.8 % — SIGNIFICANT CHANGE UP (ref 0–6)
GLUCOSE SERPL-MCNC: 82 MG/DL — SIGNIFICANT CHANGE UP (ref 70–99)
HCT VFR BLD CALC: 29.4 % — LOW (ref 34.5–45)
HGB BLD-MCNC: 9 G/DL — LOW (ref 11.5–15.5)
HYPOCHROMIA BLD QL: SLIGHT — SIGNIFICANT CHANGE UP
IMM GRANULOCYTES # BLD AUTO: 0.01 K/UL — SIGNIFICANT CHANGE UP (ref 0–0.07)
IMM GRANULOCYTES NFR BLD AUTO: 0.2 % — SIGNIFICANT CHANGE UP (ref 0–0.9)
INR BLD: 1.02 RATIO — SIGNIFICANT CHANGE UP (ref 0.85–1.16)
LYMPHOCYTES # BLD AUTO: 1.45 K/UL — SIGNIFICANT CHANGE UP (ref 1–3.3)
LYMPHOCYTES # BLD MANUAL: 1.23 K/UL — SIGNIFICANT CHANGE UP (ref 1–3.3)
LYMPHOCYTES NFR BLD AUTO: 29.6 % — SIGNIFICANT CHANGE UP (ref 13–44)
LYMPHOCYTES NFR BLD MANUAL: 25 % — SIGNIFICANT CHANGE UP (ref 13–44)
MANUAL NEUTROPHIL BANDS #: 0.04 K/UL — SIGNIFICANT CHANGE UP (ref 0–0.84)
MCHC RBC-ENTMCNC: 22.2 PG — LOW (ref 27–34)
MCHC RBC-ENTMCNC: 30.6 G/DL — LOW (ref 32–36)
MCV RBC AUTO: 72.4 FL — LOW (ref 80–100)
MONOCYTES # BLD AUTO: 0.41 K/UL — SIGNIFICANT CHANGE UP (ref 0–0.9)
MONOCYTES # BLD MANUAL: 0.37 K/UL — SIGNIFICANT CHANGE UP (ref 0–0.9)
MONOCYTES NFR BLD AUTO: 8.4 % — SIGNIFICANT CHANGE UP (ref 2–14)
MONOCYTES NFR BLD MANUAL: 7.5 % — SIGNIFICANT CHANGE UP (ref 2–14)
NEUTROPHILS # BLD AUTO: 2.89 K/UL — SIGNIFICANT CHANGE UP (ref 1.8–7.4)
NEUTROPHILS # BLD MANUAL: 3.19 K/UL — SIGNIFICANT CHANGE UP (ref 1.8–7.4)
NEUTROPHILS NFR BLD AUTO: 59 % — SIGNIFICANT CHANGE UP (ref 43–77)
NEUTROPHILS NFR BLD MANUAL: 65.1 % — SIGNIFICANT CHANGE UP (ref 43–77)
NEUTS BAND # BLD: 0.8 % — SIGNIFICANT CHANGE UP (ref 0–8)
NEUTS BAND NFR BLD: 0.8 % — SIGNIFICANT CHANGE UP (ref 0–8)
NRBC # BLD AUTO: 0 K/UL — SIGNIFICANT CHANGE UP (ref 0–0)
NRBC # FLD: 0 K/UL — SIGNIFICANT CHANGE UP (ref 0–0)
NRBC BLD AUTO-RTO: 0 /100 WBCS — SIGNIFICANT CHANGE UP (ref 0–0)
OVALOCYTES BLD QL SMEAR: ABNORMAL
PLAT MORPH BLD: NORMAL — SIGNIFICANT CHANGE UP
PLATELET # BLD AUTO: 314 K/UL — SIGNIFICANT CHANGE UP (ref 150–400)
PLATELET COUNT - ESTIMATE: NORMAL — SIGNIFICANT CHANGE UP
PMV BLD: 10.5 FL — SIGNIFICANT CHANGE UP (ref 7–13)
POLYCHROMASIA BLD QL SMEAR: SLIGHT — SIGNIFICANT CHANGE UP
POTASSIUM SERPL-MCNC: 3.9 MMOL/L — SIGNIFICANT CHANGE UP (ref 3.5–5.3)
POTASSIUM SERPL-SCNC: 3.9 MMOL/L — SIGNIFICANT CHANGE UP (ref 3.5–5.3)
PROT SERPL-MCNC: 7.5 G/DL — SIGNIFICANT CHANGE UP (ref 6.6–8.7)
PROTHROM AB SERPL-ACNC: 11.8 SEC — SIGNIFICANT CHANGE UP (ref 9.9–13.4)
RBC # BLD: 4.06 M/UL — SIGNIFICANT CHANGE UP (ref 3.8–5.2)
RBC # FLD: 17.3 % — HIGH (ref 10.3–14.5)
RBC BLD AUTO: SIGNIFICANT CHANGE UP
SODIUM SERPL-SCNC: 135 MMOL/L — SIGNIFICANT CHANGE UP (ref 135–145)
WBC # BLD: 4.9 K/UL — SIGNIFICANT CHANGE UP (ref 3.8–10.5)
WBC # FLD AUTO: 4.9 K/UL — SIGNIFICANT CHANGE UP (ref 3.8–10.5)

## 2025-06-15 PROCEDURE — 85730 THROMBOPLASTIN TIME PARTIAL: CPT

## 2025-06-15 PROCEDURE — 36430 TRANSFUSION BLD/BLD COMPNT: CPT

## 2025-06-15 PROCEDURE — 99285 EMERGENCY DEPT VISIT HI MDM: CPT | Mod: 25

## 2025-06-15 PROCEDURE — 80053 COMPREHEN METABOLIC PANEL: CPT

## 2025-06-15 PROCEDURE — 85610 PROTHROMBIN TIME: CPT

## 2025-06-15 PROCEDURE — 86900 BLOOD TYPING SEROLOGIC ABO: CPT

## 2025-06-15 PROCEDURE — 99285 EMERGENCY DEPT VISIT HI MDM: CPT

## 2025-06-15 PROCEDURE — P9040: CPT

## 2025-06-15 PROCEDURE — 86850 RBC ANTIBODY SCREEN: CPT

## 2025-06-15 PROCEDURE — 86923 COMPATIBILITY TEST ELECTRIC: CPT

## 2025-06-15 PROCEDURE — 36415 COLL VENOUS BLD VENIPUNCTURE: CPT

## 2025-06-15 PROCEDURE — 85025 COMPLETE CBC W/AUTO DIFF WBC: CPT

## 2025-06-15 PROCEDURE — 86901 BLOOD TYPING SEROLOGIC RH(D): CPT
